# Patient Record
Sex: FEMALE | URBAN - METROPOLITAN AREA
[De-identification: names, ages, dates, MRNs, and addresses within clinical notes are randomized per-mention and may not be internally consistent; named-entity substitution may affect disease eponyms.]

---

## 2019-02-08 ENCOUNTER — APPOINTMENT (RX ONLY)
Dept: URBAN - METROPOLITAN AREA CLINIC 302 | Facility: CLINIC | Age: 29
Setting detail: DERMATOLOGY
End: 2019-02-08

## 2019-02-08 DIAGNOSIS — L94.0 LOCALIZED SCLERODERMA [MORPHEA]: ICD-10-CM

## 2019-02-08 PROCEDURE — ? COUNSELING

## 2019-02-08 PROCEDURE — 99213 OFFICE O/P EST LOW 20 MIN: CPT

## 2019-02-08 ASSESSMENT — LOCATION DETAILED DESCRIPTION DERM: LOCATION DETAILED: LEFT ANTERIOR PROXIMAL THIGH

## 2019-02-08 ASSESSMENT — LOCATION ZONE DERM: LOCATION ZONE: LEG

## 2019-02-08 ASSESSMENT — LOCATION SIMPLE DESCRIPTION DERM: LOCATION SIMPLE: LEFT THIGH

## 2019-02-08 NOTE — PROCEDURE: COUNSELING
Patient Specific Counseling (Will Not Stick From Patient To Patient): 6cm x 10cm, improved from previous visit. Patient to apply topicals as needed.
Detail Level: Zone

## 2024-03-14 ENCOUNTER — APPOINTMENT (RX ONLY)
Dept: RURAL CLINIC 10 | Facility: CLINIC | Age: 34
Setting detail: DERMATOLOGY
End: 2024-03-14

## 2024-03-14 DIAGNOSIS — Q828 OTHER SPECIFIED ANOMALIES OF SKIN: ICD-10-CM

## 2024-03-14 DIAGNOSIS — L94.0 LOCALIZED SCLERODERMA [MORPHEA]: ICD-10-CM

## 2024-03-14 DIAGNOSIS — Z71.89 OTHER SPECIFIED COUNSELING: ICD-10-CM

## 2024-03-14 DIAGNOSIS — D22 MELANOCYTIC NEVI: ICD-10-CM

## 2024-03-14 DIAGNOSIS — L81.4 OTHER MELANIN HYPERPIGMENTATION: ICD-10-CM

## 2024-03-14 DIAGNOSIS — Q826 OTHER SPECIFIED ANOMALIES OF SKIN: ICD-10-CM

## 2024-03-14 DIAGNOSIS — Q819 OTHER SPECIFIED ANOMALIES OF SKIN: ICD-10-CM

## 2024-03-14 DIAGNOSIS — L71.0 PERIORAL DERMATITIS: ICD-10-CM

## 2024-03-14 PROBLEM — D22.61 MELANOCYTIC NEVI OF RIGHT UPPER LIMB, INCLUDING SHOULDER: Status: ACTIVE | Noted: 2024-03-14

## 2024-03-14 PROBLEM — D22.22 MELANOCYTIC NEVI OF LEFT EAR AND EXTERNAL AURICULAR CANAL: Status: ACTIVE | Noted: 2024-03-14

## 2024-03-14 PROBLEM — D22.62 MELANOCYTIC NEVI OF LEFT UPPER LIMB, INCLUDING SHOULDER: Status: ACTIVE | Noted: 2024-03-14

## 2024-03-14 PROBLEM — D22.4 MELANOCYTIC NEVI OF SCALP AND NECK: Status: ACTIVE | Noted: 2024-03-14

## 2024-03-14 PROBLEM — D22.5 MELANOCYTIC NEVI OF TRUNK: Status: ACTIVE | Noted: 2024-03-14

## 2024-03-14 PROBLEM — L85.8 OTHER SPECIFIED EPIDERMAL THICKENING: Status: ACTIVE | Noted: 2024-03-14

## 2024-03-14 PROBLEM — D22.71 MELANOCYTIC NEVI OF RIGHT LOWER LIMB, INCLUDING HIP: Status: ACTIVE | Noted: 2024-03-14

## 2024-03-14 PROBLEM — D22.72 MELANOCYTIC NEVI OF LEFT LOWER LIMB, INCLUDING HIP: Status: ACTIVE | Noted: 2024-03-14

## 2024-03-14 PROCEDURE — ? PRESCRIPTION

## 2024-03-14 PROCEDURE — 99203 OFFICE O/P NEW LOW 30 MIN: CPT

## 2024-03-14 PROCEDURE — ? COUNSELING

## 2024-03-14 RX ORDER — CALCIPOTRIENE 50 UG/G
OINTMENT TOPICAL
Qty: 120 | Refills: 0 | Status: ERX

## 2024-03-14 RX ORDER — TACROLIMUS 1 MG/G
OINTMENT TOPICAL
Qty: 100 | Refills: 0 | Status: ERX | COMMUNITY
Start: 2024-03-14

## 2024-03-14 RX ADMIN — TACROLIMUS: 1 OINTMENT TOPICAL at 00:00

## 2024-03-14 ASSESSMENT — LOCATION SIMPLE DESCRIPTION DERM
LOCATION SIMPLE: LOWER BACK
LOCATION SIMPLE: LEFT POSTERIOR UPPER ARM
LOCATION SIMPLE: RIGHT POSTERIOR UPPER ARM
LOCATION SIMPLE: RIGHT PRETIBIAL REGION
LOCATION SIMPLE: LEFT UPPER BACK
LOCATION SIMPLE: LEFT FOREARM
LOCATION SIMPLE: LEFT EAR
LOCATION SIMPLE: ABDOMEN
LOCATION SIMPLE: LEFT CALF
LOCATION SIMPLE: UPPER BACK
LOCATION SIMPLE: RIGHT ANTERIOR NECK
LOCATION SIMPLE: RIGHT UPPER BACK
LOCATION SIMPLE: LEFT LIP
LOCATION SIMPLE: LEFT PRETIBIAL REGION
LOCATION SIMPLE: CHEST
LOCATION SIMPLE: LEFT THIGH
LOCATION SIMPLE: RIGHT FOREARM
LOCATION SIMPLE: RIGHT SHOULDER

## 2024-03-14 ASSESSMENT — LOCATION DETAILED DESCRIPTION DERM
LOCATION DETAILED: LEFT PROXIMAL DORSAL FOREARM
LOCATION DETAILED: LEFT SUPERIOR UPPER BACK
LOCATION DETAILED: RIGHT POSTERIOR SHOULDER
LOCATION DETAILED: RIGHT PROXIMAL DORSAL FOREARM
LOCATION DETAILED: LEFT SUPERIOR HELIX
LOCATION DETAILED: RIGHT MEDIAL UPPER BACK
LOCATION DETAILED: LEFT PROXIMAL PRETIBIAL REGION
LOCATION DETAILED: MIDDLE STERNUM
LOCATION DETAILED: LEFT UPPER CUTANEOUS LIP
LOCATION DETAILED: LEFT MEDIAL SUPERIOR CHEST
LOCATION DETAILED: RIGHT PROXIMAL PRETIBIAL REGION
LOCATION DETAILED: RIGHT SUPERIOR ANTERIOR NECK
LOCATION DETAILED: RIGHT INFERIOR LATERAL NECK
LOCATION DETAILED: SUPERIOR THORACIC SPINE
LOCATION DETAILED: LEFT ANTERIOR DISTAL THIGH
LOCATION DETAILED: RIGHT PROXIMAL POSTERIOR UPPER ARM
LOCATION DETAILED: LEFT PROXIMAL POSTERIOR UPPER ARM
LOCATION DETAILED: SUPERIOR LUMBAR SPINE
LOCATION DETAILED: LEFT PROXIMAL CALF
LOCATION DETAILED: LEFT RIB CAGE
LOCATION DETAILED: RIGHT SUPERIOR LATERAL NECK

## 2024-03-14 ASSESSMENT — LOCATION ZONE DERM
LOCATION ZONE: LIP
LOCATION ZONE: LEG
LOCATION ZONE: ARM
LOCATION ZONE: TRUNK
LOCATION ZONE: EAR
LOCATION ZONE: LEG
LOCATION ZONE: NECK

## 2024-03-14 NOTE — HPI: FULL BODY SKIN EXAMINATION
What Type Of Note Output Would You Prefer (Optional)?: Standard Output
What Is The Reason For Today's Visit?: Full Body Skin Examination
What Is The Reason For Today's Visit? (Being Monitored For X): concerning skin lesions on a periodic basis
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 She had the lesion biopsied five years ago and once confirmed as morphia, she was given a topical with cortisone and vitamin D in it, and when she went in for a followup, she was told the lesion was inactive, so she has not been treating it for the last few years.

## 2024-03-14 NOTE — PROCEDURE: COUNSELING
Detail Level: Detailed
Detail Level: Generalized
Patient Specific Counseling (Will Not Stick From Patient To Patient): -recommend a urea moisturizer
Patient Specific Counseling (Will Not Stick From Patient To Patient): -will use the tacrolimus to treat as well
Patient Specific Counseling (Will Not Stick From Patient To Patient): -patient has never seen a rheumatologist or had a comprehensive blood work done to rule out systemic scleroderma, so she will have Dr. Escobar do serologic monitoring if there us a concern by monitoring specific autoantibodies in the blood (such as an anti-centromere antibody or anti-Scl70) for early detection if\\nFibrosis and/or vascular damage to internal organs is suspected.\\n-to treat the active lesions todays, patient will use tacrolimus in tandem with calcipotriene\\n-On exam, no clinical evidence of systemic sclerosis as there is no identifiable evidence of skin thickening of the fingers and toes (sclerodactyly).\\nNo evidence of abnormal small blood vessels in the fingers (nail fold capillaries) on exam today.

## 2024-05-07 ENCOUNTER — APPOINTMENT (RX ONLY)
Dept: RURAL CLINIC 10 | Facility: CLINIC | Age: 34
Setting detail: DERMATOLOGY
End: 2024-05-07

## 2024-05-07 DIAGNOSIS — L94.0 LOCALIZED SCLERODERMA [MORPHEA]: ICD-10-CM

## 2024-05-07 PROCEDURE — 99213 OFFICE O/P EST LOW 20 MIN: CPT

## 2024-05-07 PROCEDURE — ? COUNSELING

## 2024-05-07 PROCEDURE — ? PRESCRIPTION

## 2024-05-07 RX ORDER — TACROLIMUS 1 MG/G
OINTMENT TOPICAL
Qty: 100 | Refills: 0 | Status: ERX

## 2024-05-07 RX ORDER — FLUOCINONIDE 1 MG/G
CREAM TOPICAL
Qty: 120 | Refills: 0 | Status: ERX | COMMUNITY
Start: 2024-05-07

## 2024-05-07 RX ADMIN — FLUOCINONIDE: 1 CREAM TOPICAL at 00:00

## 2024-05-07 ASSESSMENT — LOCATION SIMPLE DESCRIPTION DERM
LOCATION SIMPLE: LEFT PRETIBIAL REGION
LOCATION SIMPLE: LEFT THIGH
LOCATION SIMPLE: LEFT THIGH
LOCATION SIMPLE: LEFT PRETIBIAL REGION

## 2024-05-07 ASSESSMENT — LOCATION DETAILED DESCRIPTION DERM
LOCATION DETAILED: LEFT PROXIMAL PRETIBIAL REGION
LOCATION DETAILED: LEFT ANTERIOR DISTAL THIGH
LOCATION DETAILED: LEFT ANTERIOR DISTAL THIGH
LOCATION DETAILED: LEFT PROXIMAL PRETIBIAL REGION

## 2024-05-07 ASSESSMENT — LOCATION ZONE DERM
LOCATION ZONE: LEG
LOCATION ZONE: LEG

## 2024-05-07 NOTE — HPI: OTHER
Condition:: Morphea
Please Describe Your Condition:: Prescribed calcipotriene and tacrolimus, had reaction to calcipotriene and was only able to use tacrolimus with the occlusion technique, reports no noticeable changes in size or color of lesion

## 2024-05-07 NOTE — PROCEDURE: COUNSELING
Detail Level: Detailed
Patient Specific Counseling (Will Not Stick From Patient To Patient): -discussed further treatment, given that she has tried the potent steroids (which helped but is not her preferred choice long term) and most recently finished with the tacrolimus with limited to no improvement, discussed trying a steroid again and continuing to use the tacrolimus vs. using imiquimod \\n-discussed active vs. dormant lesions, recommend treating regardless\\n-patient is going to try to alternate between fluocinonide and tacrolimus every other week and try to use the calcipotriene if she can tolerate it\\n-records requested from previous dermatologist\\n-if no improvement, will try the imiquimod

## 2024-05-15 ENCOUNTER — RX ONLY (OUTPATIENT)
Age: 34
Setting detail: RX ONLY
End: 2024-05-15

## 2024-05-15 RX ORDER — TRIAMCINOLONE ACETONIDE 0.5 MG/G
OINTMENT TOPICAL
Qty: 110 | Refills: 0 | Status: ERX | COMMUNITY
Start: 2024-05-15

## 2024-07-02 ENCOUNTER — RX ONLY (OUTPATIENT)
Age: 34
Setting detail: RX ONLY
End: 2024-07-02

## 2024-07-02 RX ORDER — TRIAMCINOLONE ACETONIDE 1 MG/G
CREAM TOPICAL TWICE DAILY
Qty: 80 | Refills: 2 | Status: ERX | COMMUNITY
Start: 2024-07-02